# Patient Record
Sex: FEMALE | Race: WHITE | NOT HISPANIC OR LATINO | Employment: OTHER | URBAN - METROPOLITAN AREA
[De-identification: names, ages, dates, MRNs, and addresses within clinical notes are randomized per-mention and may not be internally consistent; named-entity substitution may affect disease eponyms.]

---

## 2022-06-18 ENCOUNTER — APPOINTMENT (OUTPATIENT)
Dept: GENERAL RADIOLOGY | Facility: CLINIC | Age: 72
End: 2022-06-18
Attending: PHYSICIAN ASSISTANT
Payer: MEDICARE

## 2022-06-18 ENCOUNTER — HOSPITAL ENCOUNTER (EMERGENCY)
Facility: CLINIC | Age: 72
Discharge: HOME | End: 2022-06-18
Attending: PHYSICIAN ASSISTANT | Admitting: PHYSICIAN ASSISTANT
Payer: MEDICARE

## 2022-06-18 VITALS
WEIGHT: 163 LBS | RESPIRATION RATE: 18 BRPM | SYSTOLIC BLOOD PRESSURE: 155 MMHG | OXYGEN SATURATION: 94 % | HEART RATE: 59 BPM | DIASTOLIC BLOOD PRESSURE: 79 MMHG | TEMPERATURE: 98.3 F

## 2022-06-18 DIAGNOSIS — S62.649A CLOSED NONDISPLACED FRACTURE OF PROXIMAL PHALANX OF FINGER, UNSPECIFIED FINGER, INITIAL ENCOUNTER: ICD-10-CM

## 2022-06-18 PROCEDURE — 99284 EMERGENCY DEPT VISIT MOD MDM: CPT

## 2022-06-18 PROCEDURE — 250N000013 HC RX MED GY IP 250 OP 250 PS 637: Performed by: PHYSICIAN ASSISTANT

## 2022-06-18 PROCEDURE — 73130 X-RAY EXAM OF HAND: CPT | Mod: RT

## 2022-06-18 PROCEDURE — 29125 APPL SHORT ARM SPLINT STATIC: CPT | Mod: RT

## 2022-06-18 RX ORDER — HYDROCODONE BITARTRATE AND ACETAMINOPHEN 5; 325 MG/1; MG/1
1 TABLET ORAL EVERY 6 HOURS PRN
Qty: 10 TABLET | Refills: 0 | Status: SHIPPED | OUTPATIENT
Start: 2022-06-18 | End: 2022-06-21

## 2022-06-18 RX ORDER — IBUPROFEN 600 MG/1
600 TABLET, FILM COATED ORAL ONCE
Status: COMPLETED | OUTPATIENT
Start: 2022-06-18 | End: 2022-06-18

## 2022-06-18 RX ADMIN — IBUPROFEN 600 MG: 600 TABLET, FILM COATED ORAL at 15:33

## 2022-06-18 ASSESSMENT — ENCOUNTER SYMPTOMS
BACK PAIN: 0
NECK PAIN: 0
ABDOMINAL PAIN: 0
WOUND: 1

## 2022-06-18 NOTE — ED TRIAGE NOTES
Stepped on a rock in a parking lot. Fell and landed on her left and and her fingers bent backwards. Swelling to pinky finger, ring finger and middle finger. Abrasions to knees and elbow. Unknown of when last tetanus shot was. Did not hit head. Not on blood thinners.

## 2022-06-18 NOTE — ED PROVIDER NOTES
Emergency Department Attending Supervision Note  6/18/2022  5:14 PM      I evaluated this patient in conjunction with Advanced Practice Clinician: RADHA Simental      Briefly, patient is a 71-year-old female, right-hand-dominant presenting status post mechanical fall.  She reports landing on her right hand as well as hitting her right knee and right elbow.  She denies any head trauma.  She has not taken anything for analgesia.  She denies any paresthesias though notes predominantly greatest pain to her right hand.  She reports inability to bend all of her fingers particularly her third through fifth digits.  No paresthesias reported.    Examination:   ED Triage Vitals   Enc Vitals Group      BP 06/18/22 1520 (!) 155/79      Pulse 06/18/22 1520 59      Resp 06/18/22 1520 18      Temp 06/18/22 1520 98.3  F (36.8  C)      Temp src 06/18/22 1520 Temporal      SpO2 06/18/22 1520 94 %      Weight 06/18/22 1521 73.9 kg (163 lb)      Height --       Head Circumference --       Peak Flow --       Pain Score --       Pain Loc --       Pain Edu? --       Excl. in GC? --      Nursing note and vitals reviewed.  Constitutional: Well nourished.   Head: atraumatic  Eyes: Conjunctiva normal.    ENT: Nose normal.    Neck: Normal range of motion.  CVS: Normal rate, regular rhythm.    Pulmonary: Lungs clear  GI: Abdomen soft.   MSK:  No R. Wrist tenderness, full active ROM. R. Hand: swelling to 3rd-5th digit; ecchymosis and swelling to dorsal aspect of hand, tender to palpation, decreased ROM flexion/extension of 3rd-5th digits 2/2 to pain    The finger flexors (FDS/FDP) and extensors are intact in digits 1-2, unable to fully assess digits 3-5 2/2 to pain    The thumb exam is normal, including:    Adduction, abduction, flexion, extension, opposition    There are no sensory deficits    Median, Ulnar, and Radial nerve function is normal    Radial artery pulsations are normal    Capillary refill is normal  R. Knee, nontender to palpation,  minimal abrasion, full active ROM  Neuro: Alert. Follows simple commands.  Skin: Skin is warm and dry. No rash noted.   Psychiatric: Normal affect.         XR Hand Right G/E 3 Views   Final Result   IMPRESSION:    1. Mildly displaced and impacted fracture in the proximal metadiaphysis of the proximal phalanx of the long finger. There is up to 3 mm of displacement and impaction and there is a mild degree of apex radial and volar angulation.   2. Comminuted fracture in the proximal metadiaphysis of the proximal phalanx of the ring finger. There is up to 2 mm of displacement and impaction and there is a mild degree of apex volar angulation.   3. Comminuted fracture in the proximal metadiaphysis of the proximal phalanx of the pinky finger with up to 2 mm of displacement and impaction. There is mild apex radial and volar angulation.            Patient is a 71-year-old female, right-hand-dominant presenting with right hand injury status post mechanical fall.  She is neurovascularly intact.  X-ray suggest multiple fractures to her hand notably to her third through fifth digits.  She has noted impaction fractures and mild displacement.  Decision was made to place patient in a splint.  Analgesia provided.  Ice recommended.  PA did speak to Ortho on-call to facilitate close outpatient follow-up.  The remainder of her head to toe exam is without serious trauma.    Return for increasing pain, paresthesias or should symptoms worsen or change.  DO Rogelio Perea Lindsey E, DO  06/18/22 [id number]

## 2022-06-18 NOTE — DISCHARGE INSTRUCTIONS

## 2022-06-18 NOTE — ED PROVIDER NOTES
History   Chief Complaint:  Hand Injury    The history is provided by the patient.      Dunia Deras is a 71 year old female with history of hypertension who presents with evaluation after fall. Patient was at [employer] with her daughter and fell in the parking lot after stepping on a rock approximately 60 minutes ago. Reports right hand swelling, wound on right knee, and right elbow pain. States that her head, neck, chest, abdomen, and back are not injured. Patient has history of knee issues.     Review of Systems   Cardiovascular: Negative for chest pain.   Gastrointestinal: Negative for abdominal pain.   Musculoskeletal: Negative for back pain and neck pain.   Skin: Positive for wound.   All other systems reviewed and are negative.    Allergies:  Betadine [Povidone Iodine]  Nickel  Shellfish-Derived Products  Sulfa Drugs    Medications:  Gabapentin   Duloxetine   Lisinopril  Metoprolol   Caltrate  Methocarbamol    Simvastatin     Past Medical History:     Hypertension      Social History:  Presents with daughter  Presents via private vehicle    Physical Exam     Patient Vitals for the past 24 hrs:   BP Temp Temp src Pulse Resp SpO2 Weight   06/18/22 1521 -- -- -- -- -- -- 73.9 kg (163 lb)   06/18/22 1520 (!) 155/79 98.3  F (36.8  C) Temporal 59 18 94 % --     Physical Exam  General: Well appearing, pleasant female, resting on exam bed  HEENT: No evidence of trauma.  Conjunctive are clear. Neck range of motion intact.  Nose and throat clear.  Respiratory: Good effort  Cardiovascular: Good distal perfusion  Gastrointestinal: Nondistended  Musculoskeletal: Atraumatic  Skin: Abrasion to her right knee  Neurologic: Alert.  Psych:  Patient is cooperative, with normal affect.  Right Hand: Patient has swelling and tenderness to all digits except her thumb.  Flexion and extension are intact at each joint however painful.  She has tenderness throughout her digits.  Cap refill and sensation intact of her digits.   Otherwise normal hand exam.    Emergency Department Course   Imaging:  XR Hand Right G/E 3 Views   Final Result   IMPRESSION:    1. Mildly displaced and impacted fracture in the proximal metadiaphysis of the proximal phalanx of the long finger. There is up to 3 mm of displacement and impaction and there is a mild degree of apex radial and volar angulation.   2. Comminuted fracture in the proximal metadiaphysis of the proximal phalanx of the ring finger. There is up to 2 mm of displacement and impaction and there is a mild degree of apex volar angulation.   3. Comminuted fracture in the proximal metadiaphysis of the proximal phalanx of the pinky finger with up to 2 mm of displacement and impaction. There is mild apex radial and volar angulation.        Report per radiology    Laboratory:  Labs Ordered and Resulted from Time of ED Arrival to Time of ED Departure - No data to display     Procedures    Splint Placement     Procedure: Splint Placement     Indication: Fracture    Consent: Verbal     Location: Right R fifth (pinky) finger, R fourth (ring) finger and R third (middle) finger    Preparation: Wounds were cleansed and dressed with a non-adherent bandage     Procedure detail:   Splint was applied by Myself  Splint type: Sugar-tong   Splint materilal: Fiberglass  After placement I checked and adjusted the fit as needed to ensure proper positioning/fit   Sensation and circulation are intact after splint placement     Patient Status: The patient tolerated the procedure well: Yes. There were no complications.      Emergency Department Course:     Reviewed:  I reviewed nursing notes, vitals and past medical history    Assessments:  1523 I obtained history and examined the patient as noted above.   1538 I rechecked and updated the patient. I cleaned the patient knee wound.  1623 I removed a ring from the patient finger.   1652 I rechecked and updated the patient. I showed the patient her xray.  1719 I splinted the  patients arm.  1749 I rechecked and updated the patient.    Consults:  1706 I staffed the patient with Dr. Yas Mercado.  1744 I spoke with Dr. Morgan Burger from [facility].     Interventions:  1533 Ibuprofen, 600 mg, PO      Disposition:  The patient was discharged to home.     Impression & Plan     CMS Diagnoses: None    Medical Decision Making:  Dunia Deras is a 71 year old female presents emergency room today for evaluation of a hand injury after she extended all her digits.  See HPI.  Her vitals are unremarkable.  The patient also scraped her right knee.  Her right knee wound was cleaned and dressed.  Her tetanus is up-to-date.  She has swelling and tenderness throughout her digits aside from her thumb.  Radiographs reveal fractures of her fifth, fourth, and third proximal phalanxes.  At this point, I staffed the case with Dr. Reina.  Patient will be placed in a splint and referred to orthopedics.  Hydrocodone, ice, Tylenol for pain.  Opioid risk discussed.  No other injuries are noted.  She is to return with new or worsening symptoms.  No further questions and agrees with plan.  I did discuss the case with Dr. Burger from [facility].  Before patient's radiographs were completed, we cut off her ring given the amount of swelling.    Diagnosis:    ICD-10-CM    1. Closed nondisplaced fracture of proximal phalanx of finger, unspecified finger, initial encounter  S62.649A        Discharge Medications:  Discharge Medication List as of 6/18/2022  5:48 PM      START taking these medications    Details   HYDROcodone-acetaminophen (NORCO) 5-325 MG tablet Take 1 tablet by mouth every 6 hours as needed for severe pain, Disp-10 tablet, R-0, E-Prescribe             Scribe Disclosure:  I, Elaine Dickinson, am serving as a scribe at 3:22 PM on 6/18/2022 to document services personally performed by Santhosh Chavarria PA-C based on my observations and the provider's statements to me.      Santhosh Chavarria PA-C  06/18/22  1753

## 2022-06-21 ENCOUNTER — OFFICE VISIT (OUTPATIENT)
Dept: FAMILY MEDICINE | Facility: CLINIC | Age: 72
End: 2022-06-21

## 2022-06-21 VITALS
SYSTOLIC BLOOD PRESSURE: 136 MMHG | DIASTOLIC BLOOD PRESSURE: 84 MMHG | TEMPERATURE: 98 F | HEART RATE: 60 BPM | OXYGEN SATURATION: 97 % | WEIGHT: 166 LBS | BODY MASS INDEX: 28.34 KG/M2 | HEIGHT: 64 IN

## 2022-06-21 DIAGNOSIS — S62.649A CLOSED NONDISPLACED FRACTURE OF PROXIMAL PHALANX OF FINGER, UNSPECIFIED FINGER, INITIAL ENCOUNTER: ICD-10-CM

## 2022-06-21 DIAGNOSIS — E66.3 OVERWEIGHT (BMI 25.0-29.9): ICD-10-CM

## 2022-06-21 DIAGNOSIS — Z01.818 PREOP GENERAL PHYSICAL EXAM: Primary | ICD-10-CM

## 2022-06-21 DIAGNOSIS — G89.29 CHRONIC MIDLINE LOW BACK PAIN WITHOUT SCIATICA: ICD-10-CM

## 2022-06-21 DIAGNOSIS — M54.50 CHRONIC MIDLINE LOW BACK PAIN WITHOUT SCIATICA: ICD-10-CM

## 2022-06-21 DIAGNOSIS — E78.2 MIXED HYPERLIPIDEMIA: ICD-10-CM

## 2022-06-21 DIAGNOSIS — I10 BENIGN ESSENTIAL HYPERTENSION: ICD-10-CM

## 2022-06-21 PROBLEM — Z71.89 ACP (ADVANCE CARE PLANNING): Status: ACTIVE | Noted: 2022-06-21

## 2022-06-21 PROBLEM — Z76.89 HEALTH CARE HOME: Status: ACTIVE | Noted: 2022-06-21

## 2022-06-21 LAB
BUN SERPL-MCNC: 13 MG/DL (ref 7–25)
BUN/CREATININE RATIO: 15.3 (ref 6–22)
CALCIUM SERPL-MCNC: 9.8 MG/DL (ref 8.6–10.3)
CHLORIDE SERPLBLD-SCNC: 106.1 MMOL/L (ref 98–110)
CO2 SERPL-SCNC: 28.7 MMOL/L (ref 20–32)
CREAT SERPL-MCNC: 0.85 MG/DL (ref 0.6–1.3)
GLUCOSE SERPL-MCNC: 90 MG/DL (ref 60–99)
HEMOGLOBIN: 13.3 G/DL (ref 11.7–15.7)
POTASSIUM SERPL-SCNC: 4.11 MMOL/L (ref 3.5–5.3)
SODIUM SERPL-SCNC: 141.6 MMOL/L (ref 135–146)

## 2022-06-21 PROCEDURE — 80048 BASIC METABOLIC PNL TOTAL CA: CPT | Performed by: PHYSICIAN ASSISTANT

## 2022-06-21 PROCEDURE — 85018 HEMOGLOBIN: CPT | Performed by: PHYSICIAN ASSISTANT

## 2022-06-21 PROCEDURE — 99203 OFFICE O/P NEW LOW 30 MIN: CPT | Mod: 25 | Performed by: PHYSICIAN ASSISTANT

## 2022-06-21 PROCEDURE — 36415 COLL VENOUS BLD VENIPUNCTURE: CPT | Performed by: PHYSICIAN ASSISTANT

## 2022-06-21 PROCEDURE — 93000 ELECTROCARDIOGRAM COMPLETE: CPT | Performed by: PHYSICIAN ASSISTANT

## 2022-06-21 RX ORDER — LISINOPRIL 20 MG/1
20 TABLET ORAL DAILY
COMMUNITY
Start: 2022-04-22

## 2022-06-21 RX ORDER — GABAPENTIN 300 MG/1
300 CAPSULE ORAL 2 TIMES DAILY
COMMUNITY
Start: 2022-05-27

## 2022-06-21 RX ORDER — METOPROLOL SUCCINATE 100 MG/1
100 TABLET, EXTENDED RELEASE ORAL DAILY
COMMUNITY
Start: 2022-06-02

## 2022-06-21 RX ORDER — OMEGA-3/DHA/EPA/FISH OIL 60 MG-90MG
CAPSULE ORAL
COMMUNITY

## 2022-06-21 RX ORDER — METHOCARBAMOL 750 MG/1
750 TABLET, FILM COATED ORAL DAILY
COMMUNITY
Start: 2022-04-22

## 2022-06-21 RX ORDER — SIMVASTATIN 20 MG
20 TABLET ORAL DAILY
COMMUNITY
Start: 2022-04-14

## 2022-06-21 RX ORDER — DULOXETIN HYDROCHLORIDE 30 MG/1
30 CAPSULE, DELAYED RELEASE ORAL 2 TIMES DAILY
COMMUNITY
Start: 2022-05-25

## 2022-06-21 NOTE — LETTER
June 21, 2022      Dunia Deras  [address]  [place] [address]        Dear ,    Labs were normal  - hope surgery went well.     Resulted Orders   HEMOGLOBIN ([facility])   Result Value Ref Range    Hemoglobin 13.3 11.7 - 15.7 g/dL   Basic Metabolic Panel ([facility])   Result Value Ref Range    Carbon Dioxide 28.7 20 - 32 mmol/L    Creatinine 0.85 0.60 - 1.30 mg/dL    Glucose 90 60 - 99 mg/dL    Sodium 141.6 135 - 146 mmol/L    Potassium 4.11 3.5 - 5.3 mmol/L    Chloride 106.1 98 - 110 mmol/L    Urea Nitrogen 13 7 - 25 mg/dL    Calcium 9.8 8.6 - 10.3 mg/dL    BUN/Creatinine Ratio 15.3 6 - 22       If you have any questions or concerns, please call the clinic at the number listed above.       Sincerely,      RADHA Ch

## 2022-06-21 NOTE — NURSING NOTE
Chief Complaint   Patient presents with     Establish Care     Pre-Op Exam     Right hand repair at [facility] with Dr. Barba on 06/22/22

## 2022-06-21 NOTE — PROGRESS NOTES
[facility]  [address] [place] [address]  [address]  [facility] [address]  Phone: [phone]  Fax: [phone]  [facility]  Pre-op Performing Provider: CALVIN URBINA      PREOPERATIVE EVALUATION:  Today's date: 2022    Dunia Deras is a 71 year old female who presents for a preoperative evaluation. ( 49)    Surgical Information:  Surgery/Procedure: Right hand repair- pins placed in 3 fingers   Surgery Location: [facility] [place]   Surgeon: Dr. Barba  Surgery Date: 22  Time of Surgery: PM  Where patient plans to recover: At home with family  Fax number for surgical facility: [phone]    Type of Anesthesia Anticipated: to be determined    Assessment & Plan     The proposed surgical procedure is considered INTERMEDIATE risk.    1. Preop general physical exam    2. Closed nondisplaced fracture of proximal phalanx of finger, unspecified finger, initial encounter    3. Mixed hyperlipidemia    4. Benign essential hypertension    5. Chronic midline low back pain without sciatica    6. Overweight (BMI 25.0-29.9)              Risks and Recommendations:  The patient has the following additional risks and recommendations for perioperative complications:   - No identified additional risk factors other than previously addressed    Medication Instructions:  Patient is to take all scheduled medications on the day of surgery EXCEPT for modifications listed below:   Hold lisinopril    RECOMMENDATION:  APPROVAL GIVEN to proceed with proposed procedure, without further diagnostic evaluation.        Subjective      Pt is new to our clinic.    I have reviewed the following histories: Past Medical History, Past Surgical History,  Problem List, Medication List and Allergies      HPI related to upcoming procedure: Fall in parking lot Saturday - broke 3 fingers    1. No - Have you ever had a heart attack or stroke?  2.YES - Have you ever  had surgery on your heart or blood vessels, such as a stent, coronary (heart) bypass, or surgery on an artery in the head, neck, heart, or legs? Venous ablation  3. No - Do you have chest pain when you are physically active?  4. No - Do you have a history of heart failure?  5. No - Do you currently have a cold, bronchitis, or symptoms of other respiratory (head and chest) infections?  6. No - Do you have a cough, shortness of breath, or wheezing?  7. No - Do you or anyone in your family have a history of blood clots?  8. No - Do you or anyone in your family have a serious bleeding problem, such as long-lasting bleeding after surgeries or cuts?  9. YES - Have you ever had anemia or been told to take iron pills? In Pregnancy 1971,1970  10. No - Have you had any abnormal blood loss such as black, tarry or bloody stools, or abnormal vaginal bleeding?  11. No - Have you ever had a blood transfusion?  12. Yes - Are you willing to have a blood transfusion if it is medically needed before, during, or after your surgery?  13. No - Have you or anyone in your family ever had problems with anesthesia (sedation for surgery)?  14. NO - Do you have sleep apnea, excessive snoring, or daytime drowsiness? mild  snoring  15. No - Do you have any artifical heart valves or other implanted medical devices, such as a pacemaker, defibrillator, or continuous glucose monitor?  16. No - Do you have any artifical joints?  17. No - Are you allergic to latex?  18. No - Is there any chance that you may be pregnant?    Health Care Directive:  Patient does not have a Health Care Directive or Living Will: Discussed advance care planning with patient; information given to patient to review. Pt has one at home.     Preoperative Review of :   reviewed - controlled substances reflected in medication list.      Status of Chronic Conditions:  HYPERLIPIDEMIA - Patient has a long history of significant Hyperlipidemia requiring medication for treatment  with recent good control. Patient reports no problems or side effects with the medication.     HYPERTENSION - Patient has longstanding history of HTN , currently denies any symptoms referable to elevated blood pressure. Specifically denies chest pain, palpitations, dyspnea, orthopnea, PND or peripheral edema. Blood pressure readings have been in normal range. Current medication regimen is as listed below. Patient denies any side effects of medication.     Chronic low back pain from fall several years ago - no surgeries        Review of Systems  Constitutional, neuro, ENT, endocrine, pulmonary, cardiac, gastrointestinal, genitourinary, musculoskeletal, integument and psychiatric systems are negative, except as otherwise noted.    Patient Active Problem List    Diagnosis Date Noted     Mixed hyperlipidemia 06/21/2022     Priority: Medium     Benign essential hypertension 06/21/2022     Priority: Medium     Chronic midline low back pain without sciatica 06/21/2022     Priority: Medium     Overweight (BMI 25.0-29.9) 06/21/2022     Priority: Medium     ACP (advance care planning) 06/21/2022     Priority: Low     Health Care Home 06/21/2022     Priority: Low      No past medical history on file.  Past Surgical History:   Procedure Laterality Date     CATARACT IOL, RT/LT Bilateral 2017     FOOT SURGERY Right 2016    hammertoe     LIGATE VEIN Left 2017     ROTATOR CUFF REPAIR RT/LT Right 2005     Current Outpatient Medications   Medication Sig Dispense Refill     calcium carbonate 600 mg-vitamin D 400 units (CALTRATE) 600-400 MG-UNIT per tablet Take 1 tablet by mouth 2 times daily       Cholecalciferol (VITAMIN D3 PO) Take by mouth daily       Docusate Sodium (STOOL SOFTENER LAXATIVE PO)        DULoxetine (CYMBALTA) 30 MG capsule Take 30 mg by mouth 2 times daily       fish oil-omega-3 fatty acids 500 MG capsule        gabapentin (NEURONTIN) 300 MG capsule Take 300 mg by mouth 2 times daily       HYDROcodone-acetaminophen  "(NORCO) 5-325 MG tablet Take 1 tablet by mouth every 6 hours as needed for severe pain 10 tablet 0     lisinopril (ZESTRIL) 20 MG tablet Take 20 mg by mouth daily       methocarbamol (ROBAXIN) 750 MG tablet Take 750 mg by mouth daily       metoprolol succinate ER (TOPROL XL) 100 MG 24 hr tablet Take 100 mg by mouth daily       NEW MED        simvastatin (ZOCOR) 20 MG tablet Take 20 mg by mouth daily         Allergies   Allergen Reactions     Librax [Chlordiazepoxide-Clidinium] Itching     Nickel Itching     Shellfish-Derived Products      Shrimp, crabmeat     Sulfa Drugs Itching     Betadine [Povidone Iodine] Rash        Social History     Tobacco Use     Smoking status: Former Smoker     Smokeless tobacco: Never Used   Substance Use Topics     Alcohol use: Yes     Alcohol/week: 14.0 standard drinks     Types: 14 Standard drinks or equivalent per week     No family history on file.  History   Drug Use Unknown         Objective     /84 (BP Location: Right arm, Patient Position: Sitting, Cuff Size: Adult Regular)   Pulse 60   Temp 98  F (36.7  C) (Temporal)   Ht 1.619 m (5' 3.75\")   Wt 75.3 kg (166 lb)   SpO2 97%   BMI 28.72 kg/m      Physical Exam    GENERAL APPEARANCE: healthy, alert and no distress     EYES: EOMI, PERRL     HENT: ear canals and TM's normal and nose and mouth without ulcers or lesions     NECK: no adenopathy, no asymmetry, masses, or scars and thyroid normal to palpation     RESP: lungs clear to auscultation - no rales, rhonchi or wheezes     CV: regular rates and rhythm, normal S1 S2, no S3 or S4 and no murmur, click or rub     ABDOMEN:  soft, nontender, no HSM or masses and bowel sounds normal     MS: hand in bandage     SKIN: no suspicious lesions or rashes     NEURO: Normal strength and tone, sensory exam grossly normal, mentation intact and speech normal     PSYCH: mentation appears normal. and affect normal/bright     LYMPHATICS: No cervical adenopathy    No results for input(s): " HGB, PLT, INR, NA, POTASSIUM, CR, A1C in the last 41838 hours.     Diagnostics:  Component      Latest Ref Rng & Units 6/21/2022   Carbon Dioxide      20 - 32 mmol/L 28.7   Creatinine      0.60 - 1.30 mg/dL 0.85   Glucose      60 - 99 mg/dL 90   Sodium      135 - 146 mmol/L 141.6   Potassium      3.5 - 5.3 mmol/L 4.11   Chloride      98 - 110 mmol/L 106.1   Urea Nitrogen      7 - 25 mg/dL 13   Calcium      8.6 - 10.3 mg/dL 9.8   BUN/Creatinine Ratio      6 - 22 15.3   Hemoglobin      11.7 - 15.7 g/dL 13.3       EKG: sinus bradycardia, normal axis, normal intervals, no acute ST/T changes c/w ischemia, no LVH by voltage criteria    Revised Cardiac Risk Index (RCRI):  The patient has the following serious cardiovascular risks for perioperative complications:   - No serious cardiac risks = 0 points     RCRI Interpretation: 0 points: Class I (very low risk - 0.4% complication rate)           Signed Electronically by: RADHA Ch  Copy of this evaluation report is provided to requesting physician.

## 2024-06-17 PROBLEM — Z76.89 HEALTH CARE HOME: Status: RESOLVED | Noted: 2022-06-21 | Resolved: 2024-06-17
